# Patient Record
Sex: FEMALE | URBAN - METROPOLITAN AREA
[De-identification: names, ages, dates, MRNs, and addresses within clinical notes are randomized per-mention and may not be internally consistent; named-entity substitution may affect disease eponyms.]

---

## 2024-08-19 ENCOUNTER — APPOINTMENT (OUTPATIENT)
Dept: PRIMARY CARE | Facility: CLINIC | Age: 19
End: 2024-08-19

## 2024-08-19 VITALS
SYSTOLIC BLOOD PRESSURE: 123 MMHG | HEART RATE: 70 BPM | BODY MASS INDEX: 19.74 KG/M2 | HEIGHT: 71 IN | WEIGHT: 141 LBS | DIASTOLIC BLOOD PRESSURE: 80 MMHG

## 2024-08-19 DIAGNOSIS — N93.8 DYSFUNCTIONAL UTERINE BLEEDING: ICD-10-CM

## 2024-08-19 DIAGNOSIS — Z00.00 ANNUAL PHYSICAL EXAM: Primary | ICD-10-CM

## 2024-08-19 DIAGNOSIS — F43.10 POST TRAUMATIC STRESS DISORDER: ICD-10-CM

## 2024-08-19 DIAGNOSIS — F41.8 MIXED ANXIETY DEPRESSIVE DISORDER: ICD-10-CM

## 2024-08-19 PROBLEM — M25.519 SHOULDER PAIN: Status: RESOLVED | Noted: 2024-08-19 | Resolved: 2024-08-19

## 2024-08-19 PROBLEM — J36 ABSCESS OF TONSIL: Status: RESOLVED | Noted: 2024-08-19 | Resolved: 2024-08-19

## 2024-08-19 PROBLEM — N92.6 IRREGULAR MENSTRUAL CYCLE: Status: ACTIVE | Noted: 2024-08-19

## 2024-08-19 PROBLEM — M75.41 INTERNAL IMPINGEMENT OF RIGHT SHOULDER: Status: RESOLVED | Noted: 2024-08-19 | Resolved: 2024-08-19

## 2024-08-19 PROBLEM — I88.9 LYMPHADENITIS: Status: ACTIVE | Noted: 2024-08-19

## 2024-08-19 PROBLEM — N39.0 URINARY TRACT INFECTION: Status: RESOLVED | Noted: 2024-08-19 | Resolved: 2024-08-19

## 2024-08-19 PROBLEM — M25.579 ACUTE ANKLE PAIN: Status: RESOLVED | Noted: 2024-08-19 | Resolved: 2024-08-19

## 2024-08-19 PROCEDURE — 1036F TOBACCO NON-USER: CPT | Performed by: INTERNAL MEDICINE

## 2024-08-19 PROCEDURE — 99385 PREV VISIT NEW AGE 18-39: CPT | Performed by: INTERNAL MEDICINE

## 2024-08-19 PROCEDURE — 3008F BODY MASS INDEX DOCD: CPT | Performed by: INTERNAL MEDICINE

## 2024-08-19 RX ORDER — FLUOXETINE HYDROCHLORIDE 20 MG/1
20 CAPSULE ORAL DAILY
Qty: 30 CAPSULE | Refills: 1 | Status: SHIPPED | OUTPATIENT
Start: 2024-08-19 | End: 2025-08-19
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
## 2025-10-15 ENCOUNTER — APPOINTMENT (OUTPATIENT)
Dept: OBSTETRICS AND GYNECOLOGY | Facility: CLINIC | Age: 20
End: 2025-10-15